# Patient Record
Sex: FEMALE | Race: WHITE | Employment: OTHER | URBAN - METROPOLITAN AREA
[De-identification: names, ages, dates, MRNs, and addresses within clinical notes are randomized per-mention and may not be internally consistent; named-entity substitution may affect disease eponyms.]

---

## 2018-03-22 PROBLEM — M85.89 OSTEOPENIA OF MULTIPLE SITES: Status: ACTIVE | Noted: 2018-03-22

## 2018-09-14 PROCEDURE — 83516 IMMUNOASSAY NONANTIBODY: CPT | Performed by: OPHTHALMOLOGY

## 2018-09-14 PROCEDURE — 83519 RIA NONANTIBODY: CPT | Performed by: OPHTHALMOLOGY

## 2018-09-14 PROCEDURE — 84238 ASSAY NONENDOCRINE RECEPTOR: CPT | Performed by: OPHTHALMOLOGY

## 2018-09-14 PROCEDURE — 86255 FLUORESCENT ANTIBODY SCREEN: CPT | Performed by: OPHTHALMOLOGY

## 2018-10-31 PROCEDURE — 83921 ORGANIC ACID SINGLE QUANT: CPT | Performed by: OTHER

## 2018-10-31 PROCEDURE — 86334 IMMUNOFIX E-PHORESIS SERUM: CPT | Performed by: OTHER

## 2018-10-31 PROCEDURE — 84425 ASSAY OF VITAMIN B-1: CPT | Performed by: OTHER

## 2018-10-31 PROCEDURE — 83883 ASSAY NEPHELOMETRY NOT SPEC: CPT | Performed by: OTHER

## 2018-10-31 PROCEDURE — 84165 PROTEIN E-PHORESIS SERUM: CPT | Performed by: OTHER

## 2021-08-04 PROBLEM — E46 PROTEIN-CALORIE MALNUTRITION, UNSPECIFIED SEVERITY (HCC): Status: ACTIVE | Noted: 2021-08-04

## 2024-08-21 ENCOUNTER — OFFICE VISIT (OUTPATIENT)
Dept: WOUND CARE | Facility: HOSPITAL | Age: 69
End: 2024-08-21
Attending: INTERNAL MEDICINE
Payer: MEDICARE

## 2024-08-21 VITALS
RESPIRATION RATE: 14 BRPM | SYSTOLIC BLOOD PRESSURE: 117 MMHG | DIASTOLIC BLOOD PRESSURE: 68 MMHG | BODY MASS INDEX: 19.1 KG/M2 | HEART RATE: 71 BPM | HEIGHT: 58 IN | TEMPERATURE: 97 F | WEIGHT: 91 LBS

## 2024-08-21 DIAGNOSIS — E46 PROTEIN-CALORIE MALNUTRITION, UNSPECIFIED SEVERITY (HCC): ICD-10-CM
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
## 2024-08-21 DIAGNOSIS — E55.9 VITAMIN D DEFICIENCY: ICD-10-CM

## 2024-08-21 DIAGNOSIS — L97.222 NON-PRESSURE CHRONIC ULCER OF LEFT CALF WITH FAT LAYER EXPOSED (HCC): Primary | ICD-10-CM

## 2024-08-21 DIAGNOSIS — I87.332 IDIOPATHIC CHRONIC VENOUS HYPERTENSION OF LEFT LOWER EXTREMITY WITH ULCER AND INFLAMMATION (HCC): ICD-10-CM

## 2024-08-21 PROCEDURE — 99214 OFFICE O/P EST MOD 30 MIN: CPT

## 2024-08-21 PROCEDURE — 97597 DBRDMT OPN WND 1ST 20 CM/<: CPT | Performed by: INTERNAL MEDICINE

## 2024-08-21 NOTE — PROGRESS NOTES
Patient ID: Lenora Mcdonald is a 68 year old female.    Debridement Traumatic Left;Medial Leg   Wound 08/21/24 #1 Left medial lower leg Leg Left;Medial    Performed by: Chapin Means MD  Authorized by: Chapin Means MD      Consent   Consent obtained? verbal  Consent given by: patient  Risks discussed? procedural risks discussed    Debridement Details  Performed by: physician  Debridement type: conservative sharp  Pain control: lidocaine 4%  Pain control administration type: topical    Pre-debridement measurements  Length (cm): 2  Width (cm): 1  Depth (cm): 0  Surface Area (cm^2): 2    Post-debridement measurements  Length (cm): 2  Width (cm): 1  Depth (cm): 0.1  Percent debrided: 50%  Surface Area (cm^2): 2  Area Debrided (cm^2): 1  Volume (cm^3): 0.2    Devitalized tissue debrided: biofilm and slough  Instrument(s) utilized: curette  Bleeding: small  Hemostasis obtained with: pressure  Procedural pain (0-10): 0  Post-procedural pain: 0   Response to treatment: procedure was tolerated well
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
## 2024-08-21 NOTE — PATIENT INSTRUCTIONS
Wound Cleaning and Dressings:    Wash your hands with soap and water. Always wear gloves while changing dressings. Donot touch wound / gisele-wound skin with un-gloved hands. Remove old dressing, discard and place into trash.      DRESSINGS: honey / gauze  Change dressing daily.    Compression Therapy : spandagrip  Compression Therapy Instructions:  1.  Put on first thing in the morning and may remove at bedside.  Okay to wear overnight      if comfortable.  Do not let stockings roll up/down and kink.  Hand wash stockings and      hang dry as needed.    2.  Avoid prolonged standing in one place.  It is better to have your calf muscles moving       to pump fluid out of the legs.    3.  Elevate leg(s) above the level of the heart when sitting or as much as possible.    4.  Take your diuretics as directed by your provider.  Do not skip doses or change doses      unless instructed to do so by your provider.    5. Do not get leg(s) with compression wrap wet. If wraps are too tight as indicated        By pain, numbness/tingling or discoloration of toes remove wrap completely       and call the   wound center.     Miscellaneous Instructions:  Supplement with a daily multivitamin   Low salt diet  Increase protein intake / consider protein supplements - see below  Elevate extremities at all times when sitting / laying down.    DIETARY MODIFICATIONS TO HELP WITH WOUND HEALING:    Protein: Meats, beans, eggs, milk and yogurt particularly Greek yogurt), tofu, soy nuts, soy protein products    Vitamin C: Citrus fruits and juices, strawberries, tomatoes, tomato juice, peppers, baked potatoes, spinach, broccoli, cauliflower, [place] sprouts, cabbage    Vitamin A: Dark green, leafy vegetables, orange or yellow vegetables, cantaloupe, fortified dairy products, liver, fortified cereals    Zinc: Fortified cereals, red meats, seafood    Consider Akhil [employer] (These are essential branch chain amino acids that help with tissue  building and wound healing) and take 2 packets/day. you can order online at [employer] or [employer]    ADDITIONAL REMINDERS:    The treatment plan has been discussed at length with you and your provider. Follow all instructions carefully, it is very important. If you do not follow all instructions, you are at  risk of your wound not healing, infection, possible loss of limb and even end of life.  Please call the clinic during regular business hours ( 7:30 AM - 5:30 PM) if you notice increased bleeding, redness, warmth, pain or pus like drainage or start running a fever greater than 100.3.    For after hour emergencies, please call your primary physician or go to the nearest emergency room.

## 2024-08-28 ENCOUNTER — OFFICE VISIT (OUTPATIENT)
Dept: WOUND CARE | Facility: HOSPITAL | Age: 69
End: 2024-08-28
Attending: FAMILY MEDICINE
Payer: MEDICARE

## 2024-08-28 VITALS
HEART RATE: 72 BPM | RESPIRATION RATE: 14 BRPM | TEMPERATURE: 97 F | DIASTOLIC BLOOD PRESSURE: 50 MMHG | SYSTOLIC BLOOD PRESSURE: 110 MMHG

## 2024-08-28 DIAGNOSIS — I87.332 IDIOPATHIC CHRONIC VENOUS HYPERTENSION OF LEFT LOWER EXTREMITY WITH ULCER AND INFLAMMATION (HCC): ICD-10-CM

## 2024-08-28 DIAGNOSIS — L97.222 NON-PRESSURE CHRONIC ULCER OF LEFT CALF WITH FAT LAYER EXPOSED (HCC): Primary | ICD-10-CM

## 2024-08-28 DIAGNOSIS — E46 PROTEIN-CALORIE MALNUTRITION, UNSPECIFIED SEVERITY (HCC): ICD-10-CM

## 2024-08-28 DIAGNOSIS — E55.9 VITAMIN D DEFICIENCY: ICD-10-CM
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
## 2024-08-28 PROCEDURE — 11042 DBRDMT SUBQ TIS 1ST 20SQCM/<: CPT | Performed by: INTERNAL MEDICINE
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
## 2024-08-28 NOTE — PROGRESS NOTES
Patient ID: Lenora Mcdonald is a 68 year old female.    Debridement Traumatic Left;Medial Leg   Wound 08/21/24 #1 Left medial lower leg Leg Left;Medial    Performed by: Chapin Means MD  Authorized by: Chapin Means MD      Consent   Consent obtained? verbal  Consent given by: patient  Risks discussed? procedural risks discussed    Debridement Details  Performed by: physician  Debridement type: surgical  Level of debridement: subcutaneous tissue  Pain control: lidocaine 4%  Pain control administration type: topical    Pre-debridement measurements  Length (cm): 1.5  Width (cm): 0.7  Depth (cm): 0.3  Surface Area (cm^2): 1.05    Post-debridement measurements  Length (cm): 1.5  Width (cm): 0.7  Depth (cm): 0.4  Percent debrided: 100%  Surface Area (cm^2): 1.05  Area Debrided (cm^2): 1.05  Volume (cm^3): 0.42    Tissue and other material debrided: subcutaneous tissue  Devitalized tissue debrided: biofilm and slough  Instrument(s) utilized: curette  Bleeding: medium  Hemostasis obtained with: pressure  Procedural pain (0-10): 4  Post-procedural pain: 0   Response to treatment: procedure was tolerated well

## 2024-08-28 NOTE — PROGRESS NOTES
Weekly Wound Education Note    Teaching Provided To: Patient  Training Topics: Cleasing and general instructions;Compression;Discharge instructions;Dressing;Edema control  Training Method: Explain/Verbal  Training Response: Patient responds and understands;Reinforcement needed        Notes: Stable.Continue honey gel and bordered gauze - change daily. Increase compression to 2 spandagrip D layers, ok to remove 1 layer at bedtime.

## 2024-08-28 NOTE — PATIENT INSTRUCTIONS
Return one week    Wound Cleaning and Dressings:    Wash your hands with soap and water. Always wear gloves while changing dressings. Donot touch wound / gisele-wound skin with un-gloved hands. Remove old dressing, discard and place into trash.      DRESSINGS: honey / gauze  Change dressing daily.    Compression Therapy : Double spandagrip  Compression Therapy Instructions:  1.  Put on first thing in the morning and may remove at bedside.  Okay to wear overnight      if comfortable.  Do not let stockings roll up/down and kink.  Hand wash stockings and      hang dry as needed.    2.  Avoid prolonged standing in one place.  It is better to have your calf muscles moving       to pump fluid out of the legs.    3.  Elevate leg(s) above the level of the heart when sitting or as much as possible.    4.  Take your diuretics as directed by your provider.  Do not skip doses or change doses      unless instructed to do so by your provider.    5. Do not get leg(s) with compression wrap wet. If wraps are too tight as indicated        By pain, numbness/tingling or discoloration of toes remove wrap completely       and call the   wound center.     Miscellaneous Instructions:  Supplement with a daily multivitamin   Low salt diet  Increase protein intake / consider protein supplements - see below  Elevate extremities at all times when sitting / laying down.    DIETARY MODIFICATIONS TO HELP WITH WOUND HEALING:    Protein: Meats, beans, eggs, milk and yogurt particularly Greek yogurt), tofu, soy nuts, soy protein products    Vitamin C: Citrus fruits and juices, strawberries, tomatoes, tomato juice, peppers, baked potatoes, spinach, broccoli, cauliflower, [place] sprouts, cabbage    Vitamin A: Dark green, leafy vegetables, orange or yellow vegetables, cantaloupe, fortified dairy products, liver, fortified cereals    Zinc: Fortified cereals, red meats, seafood    Consider Akhil by [employer] (These are essential branch chain amino  acids that help with tissue building and wound healing) and take 2 packets/day. you can order online at [employer] or [employer]    ADDITIONAL REMINDERS:    The treatment plan has been discussed at length with you and your provider. Follow all instructions carefully, it is very important. If you do not follow all instructions, you are at  risk of your wound not healing, infection, possible loss of limb and even end of life.  Please call the clinic during regular business hours ( 7:30 AM - 5:30 PM) if you notice increased bleeding, redness, warmth, pain or pus like drainage or start running a fever greater than 100.3.    For after hour emergencies, please call your primary physician or go to the nearest emergency room.

## 2024-09-04 ENCOUNTER — OFFICE VISIT (OUTPATIENT)
Dept: WOUND CARE | Facility: HOSPITAL | Age: 69
End: 2024-09-04
Attending: FAMILY MEDICINE
Payer: MEDICARE

## 2024-09-04 VITALS
HEART RATE: 71 BPM | RESPIRATION RATE: 16 BRPM | DIASTOLIC BLOOD PRESSURE: 67 MMHG | TEMPERATURE: 97 F | SYSTOLIC BLOOD PRESSURE: 116 MMHG

## 2024-09-04 DIAGNOSIS — I87.332 IDIOPATHIC CHRONIC VENOUS HYPERTENSION OF LEFT LOWER EXTREMITY WITH ULCER AND INFLAMMATION (HCC): ICD-10-CM

## 2024-09-04 DIAGNOSIS — T14.8XXD DELAYED WOUND HEALING: ICD-10-CM

## 2024-09-04 DIAGNOSIS — R23.8 SLOUGHING OF WOUND: ICD-10-CM

## 2024-09-04 DIAGNOSIS — E46 PROTEIN-CALORIE MALNUTRITION, UNSPECIFIED SEVERITY (HCC): ICD-10-CM

## 2024-09-04 DIAGNOSIS — L97.222 NON-PRESSURE CHRONIC ULCER OF LEFT CALF WITH FAT LAYER EXPOSED (HCC): Primary | ICD-10-CM

## 2024-09-04 DIAGNOSIS — E55.9 VITAMIN D DEFICIENCY: ICD-10-CM
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
## 2024-09-04 PROCEDURE — 97597 DBRDMT OPN WND 1ST 20 CM/<: CPT | Performed by: INTERNAL MEDICINE

## 2024-09-04 NOTE — PATIENT INSTRUCTIONS
Return Monday for nurse visit and weekly nurse visits.   Switch to provider visit in case of concerns.   See me 9/25/24    Wound Cleaning and Dressings:    Wash your hands with soap and water. Always wear gloves while changing dressings. Donot touch wound / gisele-wound skin with un-gloved hands. Remove old dressing, discard and place into trash.      DRESSINGS: endoform / HF transfer  Coloplast triad paste periwound.   Change dressing twice a week.     Compression Therapy : UNNA boot.   Compression Therapy Instructions:  1.  Put on first thing in the morning and may remove at bedside.  Okay to wear overnight      if comfortable.  Do not let stockings roll up/down and kink.  Hand wash stockings and      hang dry as needed.    2.  Avoid prolonged standing in one place.  It is better to have your calf muscles moving       to pump fluid out of the legs.    3.  Elevate leg(s) above the level of the heart when sitting or as much as possible.    4.  Take your diuretics as directed by your provider.  Do not skip doses or change doses      unless instructed to do so by your provider.    5. Do not get leg(s) with compression wrap wet. If wraps are too tight as indicated        By pain, numbness/tingling or discoloration of toes remove wrap completely       and call the   wound center.     Miscellaneous Instructions:  Supplement with a daily multivitamin   Low salt diet  Increase protein intake / consider protein supplements - see below  Elevate extremities at all times when sitting / laying down.    DIETARY MODIFICATIONS TO HELP WITH WOUND HEALING:    Protein: Meats, beans, eggs, milk and yogurt particularly Greek yogurt), tofu, soy nuts, soy protein products    Vitamin C: Citrus fruits and juices, strawberries, tomatoes, tomato juice, peppers, baked potatoes, spinach, broccoli, cauliflower, [place] sprouts, cabbage    Vitamin A: Dark green, leafy vegetables, orange or yellow vegetables, cantaloupe, fortified dairy  products, liver, fortified cereals    Zinc: Fortified cereals, red meats, seafood    Consider Akhil by [employer] (These are essential branch chain amino acids that help with tissue building and wound healing) and take 2 packets/day. you can order online at [employer] or [employer]    ADDITIONAL REMINDERS:    The treatment plan has been discussed at length with you and your provider. Follow all instructions carefully, it is very important. If you do not follow all instructions, you are at  risk of your wound not healing, infection, possible loss of limb and even end of life.  Please call the clinic during regular business hours ( 7:30 AM - 5:30 PM) if you notice increased bleeding, redness, warmth, pain or pus like drainage or start running a fever greater than 100.3.    For after hour emergencies, please call your primary physician or go to the nearest emergency room.

## 2024-09-04 NOTE — PROGRESS NOTES
Patient ID: Lenora Mcdonald is a 68 year old female.    Debridement Traumatic Left;Medial Leg   Wound 08/21/24 #1 Left medial lower leg Leg Left;Medial    Performed by: Chapin Means MD  Authorized by: Chapin Means MD      Consent   Consent obtained? verbal  Consent given by: patient  Risks discussed? procedural risks discussed    Debridement Details  Performed by: physician  Debridement type: conservative sharp  Pain control: lidocaine 4%  Pain control administration type: topical    Pre-debridement measurements  Length (cm): 1.6  Width (cm): 0.5  Depth (cm): 0.2  Surface Area (cm^2): 0.8    Post-debridement measurements  Length (cm): 1.6  Width (cm): 0.5  Depth (cm): 0.2  Percent debrided: 100%  Surface Area (cm^2): 0.8  Area Debrided (cm^2): 0.8  Volume (cm^3): 0.16    Devitalized tissue debrided: biofilm and slough  Instrument(s) utilized: curette  Bleeding: small  Hemostasis obtained with: pressure  Procedural pain (0-10): 2  Post-procedural pain: 0   Response to treatment: procedure was tolerated well

## 2024-09-04 NOTE — PROGRESS NOTES
Weekly Wound Education Note    Teaching Provided To: Patient  Training Topics: Cleasing and general instructions;Compression;Discharge instructions;Dressing;Edema control  Training Method: Explain/Verbal  Training Response: Patient responds and understands;Reinforcement needed        Notes: Stable. honey gel, honey alginate, kerramax, unna boot 20-30mmhg. Compression wrap information sheet provided, educated on compression.
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
## 2024-09-09 ENCOUNTER — OFFICE VISIT (OUTPATIENT)
Dept: WOUND CARE | Facility: HOSPITAL | Age: 69
End: 2024-09-09
Attending: INTERNAL MEDICINE
Payer: MEDICARE

## 2024-09-09 VITALS
DIASTOLIC BLOOD PRESSURE: 59 MMHG | TEMPERATURE: 98 F | HEART RATE: 81 BPM | RESPIRATION RATE: 14 BRPM | SYSTOLIC BLOOD PRESSURE: 113 MMHG

## 2024-09-09 DIAGNOSIS — L97.222 NON-PRESSURE CHRONIC ULCER OF LEFT CALF WITH FAT LAYER EXPOSED (HCC): Primary | ICD-10-CM

## 2024-09-09 DIAGNOSIS — I87.332 IDIOPATHIC CHRONIC VENOUS HYPERTENSION OF LEFT LOWER EXTREMITY WITH ULCER AND INFLAMMATION (HCC): ICD-10-CM

## 2024-09-09 PROCEDURE — 29581 APPL MULTLAYER CMPRN SYS LEG: CPT

## 2024-09-09 NOTE — PROGRESS NOTES
Chief Complaint   Patient presents with    Wound Care     Patient is here for a nurse visit for a left leg wound.           Current Outpatient Medications:     Esomeprazole Magnesium 40 MG Oral Capsule Delayed Release, Take 1 capsule (40 mg total) by mouth every morning before breakfast., Disp: 90 capsule, Rfl: 3    SUMAtriptan Succinate (IMITREX) 50 MG Oral Tab, TAKE 2 TABLETS IN 24 HOURS AND NOT MORE THEN 5 IN 1 WEEK, Disp: 27 tablet, Rfl: 0    No Known Allergies       HISTORY:     Past medical, surgical, family and social history updated where appropriate.    PHYSICAL EXAM:   /59   Pulse 81   Temp 97.8 °F (36.6 °C)   Resp 14   LMP  (LMP Unknown)        Vital signs reviewed.      Calf  Point of Measurement - Left Calf: 30        Calf Left cm:: 27.8          Ankle  Point of Measurement - Left Ankle: 10        Left Ankle cm:: 16.5                Wound 08/21/24 #1 Left medial lower leg Leg Left;Medial (Active)   Date First Assessed/Time First Assessed: 08/21/24 0738    Wound Number (Wound Clinic Only): #1 Left medial lower leg  Primary Wound Type: Traumatic  Location: Leg  Wound Location Orientation: Left;Medial      Assessments 8/21/2024  7:39 AM 9/9/2024  8:57 AM   Wound Image        Drainage Amount Unable to assess Scant   Drainage Description -- Serous;Yellow   Treatments Compression Compression   Wound Length (cm) 2 cm 1.5 cm   Wound Width (cm) 1 cm 0.6 cm   Wound Surface Area (cm^2) 2 cm^2 0.9 cm^2   Wound Depth (cm) 0 cm 0.2 cm   Wound Volume (cm^3) 0 cm^3 0.18 cm^3   Margins Well-defined edges Well-defined edges   Non-staged Wound Description Full thickness Full thickness   Zarina-wound Assessment Induration;Pink Dry;Clean;Hemosiderin staining   Wound Granulation Tissue Firm;Red Pink;Firm   Wound Bed Granulation (%) 10 % 95 %   Wound Bed Slough (%) 90 % 5 %   Wound Odor None None   Tunneling? No No   Undermining? No No   Sinus Tracts? No No       Inactive Orders   Date Order Priority Status  Authorizing Provider   09/04/24 1556 Debridement Traumatic Left;Medial Leg Routine Completed Chapin Means MD   08/28/24 0848 Debridement Traumatic Left;Medial Leg Routine Completed Chapin Means MD   08/21/24 0809 Debridement Traumatic Left;Medial Leg Routine Completed Chapin Means MD       Compression Wrap 09/09/24 Leg Anterior;Left (Active)   Placement Date/Time: 09/09/24 0900   Location: Leg  Wound Location Orientation: Anterior;Left      Assessments 9/9/2024 10:46 AM   Response to Treatment Well tolerated   Compression Layers Multilayer   Compression Product Type Unna Boot   Dressing Applied Yes   Compression Wrap Location Toes to Knee   Compression Wrap Status Clean;Dry;Intact       No associated orders.          ASSESSMENT AND PLAN:        Risks, benefits, and alternatives of current treatment plan discussed in detail.  Questions and concerns addressed. Red flags to RTC or ED reviewed.  Patient (or parent) agrees to plan.      No follow-ups on file.  Weekly Wound Education Note    Teaching Provided To: Patient  Training Topics: Cleasing and general instructions;Compression;Discharge instructions;Edema control;Dressing  Training Method: Explain/Verbal  Training Response: Patient responds and understands         Patient arrived to clinic for RN visit. Edema measurements are slightly decreased. Wound measurements are stable and wound bed appears healthier. Patient had complaints of a burning pain medial to the medial ankle bone. 1/2 ABD pad used to pad that area. Changed dressing to Endoform, Hydrofera Transfer, 1/2 ABD pad over wound, and unna-boot 20-30mmHg per provider's orders. Instructed patient to follow up within a week and call if any issues with the unna-boot arise.               Hector ROJO RN   9/9/2024  10:49 AM

## 2024-09-11 ENCOUNTER — APPOINTMENT (OUTPATIENT)
Dept: WOUND CARE | Facility: HOSPITAL | Age: 69
End: 2024-09-11
Attending: INTERNAL MEDICINE
Payer: MEDICARE

## 2024-09-16 ENCOUNTER — OFFICE VISIT (OUTPATIENT)
Dept: WOUND CARE | Facility: HOSPITAL | Age: 69
End: 2024-09-16
Attending: INTERNAL MEDICINE
Payer: MEDICARE

## 2024-09-16 VITALS
DIASTOLIC BLOOD PRESSURE: 60 MMHG | TEMPERATURE: 97 F | SYSTOLIC BLOOD PRESSURE: 117 MMHG | RESPIRATION RATE: 14 BRPM | HEART RATE: 68 BPM

## 2024-09-16 DIAGNOSIS — L97.222 NON-PRESSURE CHRONIC ULCER OF LEFT CALF WITH FAT LAYER EXPOSED (HCC): Primary | ICD-10-CM

## 2024-09-16 DIAGNOSIS — I87.332 IDIOPATHIC CHRONIC VENOUS HYPERTENSION OF LEFT LOWER EXTREMITY WITH ULCER AND INFLAMMATION (HCC): ICD-10-CM

## 2024-09-16 PROCEDURE — 29581 APPL MULTLAYER CMPRN SYS LEG: CPT

## 2024-09-16 NOTE — PROGRESS NOTES
Chief Complaint   Patient presents with    Wound Care     Arrives for nurse visit. Unna boot intact. Denies new concerns.           Current Outpatient Medications:     Esomeprazole Magnesium 40 MG Oral Capsule Delayed Release, Take 1 capsule (40 mg total) by mouth every morning before breakfast., Disp: 90 capsule, Rfl: 3    SUMAtriptan Succinate (IMITREX) 50 MG Oral Tab, TAKE 2 TABLETS IN 24 HOURS AND NOT MORE THEN 5 IN 1 WEEK, Disp: 27 tablet, Rfl: 0    No Known Allergies       HISTORY:     Past medical, surgical, family and social history updated where appropriate.    PHYSICAL EXAM:   /60   Pulse 68   Temp 97.3 °F (36.3 °C)   Resp 14   LMP  (LMP Unknown)        Vital signs reviewed.      Calf  Point of Measurement - Left Calf: 30     Left Calf from:: Heel  Calf Left cm:: 28.5          Ankle  Point of Measurement - Left Ankle: 10     Left Ankle from:: Heel  Left Ankle cm:: 17.8                Wound 08/21/24 #1 Left medial lower leg Leg Left;Medial (Active)   Date First Assessed/Time First Assessed: 08/21/24 0738    Wound Number (Wound Clinic Only): #1 Left medial lower leg  Primary Wound Type: Traumatic  Location: Leg  Wound Location Orientation: Left;Medial      Assessments 8/21/2024  7:39 AM 9/16/2024  8:30 AM   Wound Image         Drainage Amount Unable to assess Scant   Drainage Description -- Serous;Yellow   Treatments Compression Compression   Wound Length (cm) 2 cm 1 cm   Wound Width (cm) 1 cm 0.4 cm   Wound Surface Area (cm^2) 2 cm^2 0.4 cm^2   Wound Depth (cm) 0 cm 0.1 cm   Wound Volume (cm^3) 0 cm^3 0.04 cm^3   Margins Well-defined edges Well-defined edges   Non-staged Wound Description Full thickness Full thickness   Zarina-wound Assessment Induration;Pink Dry;Clean;Hemosiderin staining   Wound Granulation Tissue Firm;Red Pink;Firm   Wound Bed Granulation (%) 10 % 70 %   Wound Bed Slough (%) 90 % 30 %   Wound Odor None None   Tunneling? No No   Undermining? No No   Sinus Tracts? No No        Inactive Orders   Date Order Priority Status Authorizing Provider   09/04/24 1556 Debridement Traumatic Left;Medial Leg Routine Completed Chapin Means MD   08/28/24 0848 Debridement Traumatic Left;Medial Leg Routine Completed Chapin Means MD   08/21/24 0809 Debridement Traumatic Left;Medial Leg Routine Completed Chapin Means MD       Compression Wrap 09/09/24 Leg Anterior;Left (Active)   Placement Date/Time: 09/09/24 0900   Location: Leg  Wound Location Orientation: Anterior;Left      Assessments 9/9/2024 10:46 AM   Response to Treatment Well tolerated   Compression Layers Multilayer   Compression Product Type Unna Boot   Dressing Applied Yes   Compression Wrap Location Toes to Knee   Compression Wrap Status Clean;Dry;Intact       No associated orders.          ASSESSMENT AND PLAN:        Risks, benefits, and alternatives of current treatment plan discussed in detail.  Questions and concerns addressed. Red flags to RTC or ED reviewed.  Patient (or parent) agrees to plan.      No follow-ups on file.  Weekly Wound Education Note    Teaching Provided To: Patient  Training Topics: Cleasing and general instructions;Compression;Discharge instructions;Dressing;Edema control  Training Method: Explain/Verbal  Training Response: Patient responds and understands         Patient arrived for RN visit. Arrives with unna-boot 20-30mmHg in place. No issues or concerns with compression wrap. Patient did mention that the wound is more painful for a couple hours after the appointment, but the pain subsides after the couple hours. Patient reports taking Tylenol to decrease the initial pain. Encouraged patient to ice her leg to decrease pain as well as elevate the leg. Edema measurement to left leg is stable, slightly elevated. Wound is measuring smaller. Wound was dry so dressing changed to Tamra Ag, folded xeroform, 1/2 ABD pad, and unna-boot 20-30mmHg. Patient to return to clinic this Friday for RN  visit.               Hector ROJO RN   9/16/2024  9:10 AM

## 2024-09-18 ENCOUNTER — APPOINTMENT (OUTPATIENT)
Dept: WOUND CARE | Facility: HOSPITAL | Age: 69
End: 2024-09-18
Attending: INTERNAL MEDICINE
Payer: MEDICARE

## 2024-09-20 ENCOUNTER — OFFICE VISIT (OUTPATIENT)
Dept: WOUND CARE | Facility: HOSPITAL | Age: 69
End: 2024-09-20
Attending: INTERNAL MEDICINE
Payer: MEDICARE

## 2024-09-20 VITALS
SYSTOLIC BLOOD PRESSURE: 110 MMHG | HEART RATE: 71 BPM | DIASTOLIC BLOOD PRESSURE: 65 MMHG | TEMPERATURE: 98 F | RESPIRATION RATE: 14 BRPM

## 2024-09-20 DIAGNOSIS — L97.222 NON-PRESSURE CHRONIC ULCER OF LEFT CALF WITH FAT LAYER EXPOSED (HCC): Primary | ICD-10-CM

## 2024-09-20 PROCEDURE — 29581 APPL MULTLAYER CMPRN SYS LEG: CPT

## 2024-09-20 NOTE — PROGRESS NOTES
Chief Complaint   Patient presents with    Wound Care     Patient arrives for an RN visit. Patient states she has no pain. Patient arrives with an unna wrap to the left leg; wrap stayed up well. Patient has xeroform and an ABD pad to the wound.            Current Outpatient Medications:     Esomeprazole Magnesium 40 MG Oral Capsule Delayed Release, Take 1 capsule (40 mg total) by mouth every morning before breakfast., Disp: 90 capsule, Rfl: 3    SUMAtriptan Succinate (IMITREX) 50 MG Oral Tab, TAKE 2 TABLETS IN 24 HOURS AND NOT MORE THEN 5 IN 1 WEEK, Disp: 27 tablet, Rfl: 0    No Known Allergies       HISTORY:     Past medical, surgical, family and social history updated where appropriate.    PHYSICAL EXAM:   /65   Pulse 71   Temp 98 °F (36.7 °C)   Resp 14   LMP  (LMP Unknown)        Vital signs reviewed.      Calf  Point of Measurement - Left Calf: 30     Left Calf from:: Heel  Calf Left cm:: 28.5          Ankle  Point of Measurement - Left Ankle: 10     Left Ankle from:: Heel  Left Ankle cm:: 17.5                Wound 08/21/24 #1 Left medial lower leg Leg Left;Medial (Active)   Date First Assessed/Time First Assessed: 08/21/24 0738    Wound Number (Wound Clinic Only): #1 Left medial lower leg  Primary Wound Type: Traumatic  Location: Leg  Wound Location Orientation: Left;Medial      Assessments 8/21/2024  7:39 AM 9/20/2024  8:11 AM   Wound Image        Drainage Amount Unable to assess Small   Drainage Description -- Serosanguineous   Treatments Compression Compression   Wound Length (cm) 2 cm 0.9 cm   Wound Width (cm) 1 cm 0.3 cm   Wound Surface Area (cm^2) 2 cm^2 0.27 cm^2   Wound Depth (cm) 0 cm 0.1 cm   Wound Volume (cm^3) 0 cm^3 0.027 cm^3   Margins Well-defined edges Well-defined edges   Non-staged Wound Description Full thickness Full thickness   Zarina-wound Assessment Induration;Pink Moist;Clean   Wound Granulation Tissue Firm;Red Pink;Pale Grey;Firm   Wound Bed Granulation (%) 10 % 100 %   Wound  Bed Slough (%) 90 % --   Wound Odor None None   Tunneling? No No   Undermining? No No   Sinus Tracts? No No       Inactive Orders   Date Order Priority Status Authorizing Provider   09/04/24 1556 Debridement Traumatic Left;Medial Leg Routine Completed Chapin Means MD   08/28/24 0848 Debridement Traumatic Left;Medial Leg Routine Completed Chapin Means MD   08/21/24 0809 Debridement Traumatic Left;Medial Leg Routine Completed Chapin Means MD       Compression Wrap 09/09/24 Leg Anterior;Left (Active)   Placement Date/Time: 09/09/24 0900   Location: Leg  Wound Location Orientation: Anterior;Left      Assessments 9/9/2024 10:46 AM 9/20/2024  8:11 AM   Response to Treatment Well tolerated Well tolerated   Compression Layers Multilayer Multilayer   Compression Product Type Unna Boot Unna Boot   Dressing Applied Yes Yes   Compression Wrap Location Toes to Knee Toes to Knee   Compression Wrap Status Clean;Dry;Intact Clean;Dry;Intact       No associated orders.          ASSESSMENT AND PLAN:        Risks, benefits, and alternatives of current treatment plan discussed in detail.  Questions and concerns addressed. Red flags to RTC or ED reviewed.  Patient (or parent) agrees to plan.      No follow-ups on file.  Weekly Wound Education Note    Teaching Provided To: Patient  Training Topics: Cleasing and general instructions;Diabetes;Discharge instructions;Dressing  Training Method: Explain/Verbal  Training Response: Patient responds and understands         Arrives for RN visit. Edema measurements stable. Wound measuring slightly smaller. Patient brought back own dressing supplies from last time. Continue Tamra Ag, folded xeroform, 1/2 ABD pad, and calamine unna-boot 20-30mmHg. 1/2 ABD pad placed over lateral & medial side of ankle for added cushion/protection.               Hector ROJO RN   9/20/2024  8:28 AM

## 2024-09-25 ENCOUNTER — OFFICE VISIT (OUTPATIENT)
Dept: WOUND CARE | Facility: HOSPITAL | Age: 69
End: 2024-09-25
Attending: INTERNAL MEDICINE
Payer: MEDICARE

## 2024-09-25 VITALS
SYSTOLIC BLOOD PRESSURE: 111 MMHG | HEART RATE: 108 BPM | DIASTOLIC BLOOD PRESSURE: 48 MMHG | TEMPERATURE: 98 F | RESPIRATION RATE: 14 BRPM

## 2024-09-25 DIAGNOSIS — I87.332 IDIOPATHIC CHRONIC VENOUS HYPERTENSION OF LEFT LOWER EXTREMITY WITH ULCER AND INFLAMMATION (HCC): ICD-10-CM

## 2024-09-25 DIAGNOSIS — E55.9 VITAMIN D DEFICIENCY: ICD-10-CM

## 2024-09-25 DIAGNOSIS — R23.8 SLOUGHING OF WOUND: ICD-10-CM

## 2024-09-25 DIAGNOSIS — T14.8XXD DELAYED WOUND HEALING: ICD-10-CM

## 2024-09-25 DIAGNOSIS — L97.222 NON-PRESSURE CHRONIC ULCER OF LEFT CALF WITH FAT LAYER EXPOSED (HCC): Primary | ICD-10-CM

## 2024-09-25 PROCEDURE — 29581 APPL MULTLAYER CMPRN SYS LEG: CPT

## 2024-09-25 NOTE — PATIENT INSTRUCTIONS
See me one week    Wound Cleaning and Dressings:    Wash your hands with soap and water. Always wear gloves while changing dressings. Donot touch wound / gisele-wound skin with un-gloved hands. Remove old dressing, discard and place into trash.      DRESSINGS:xeroform / unna boot  Change dressing weekly    Compression Therapy : UNNA boot.   Compression Therapy Instructions:  1.  Put on first thing in the morning and may remove at bedside.  Okay to wear overnight      if comfortable.  Do not let stockings roll up/down and kink.  Hand wash stockings and      hang dry as needed.    2.  Avoid prolonged standing in one place.  It is better to have your calf muscles moving       to pump fluid out of the legs.    3.  Elevate leg(s) above the level of the heart when sitting or as much as possible.    4.  Take your diuretics as directed by your provider.  Do not skip doses or change doses      unless instructed to do so by your provider.    5. Do not get leg(s) with compression wrap wet. If wraps are too tight as indicated        By pain, numbness/tingling or discoloration of toes remove wrap completely       and call the   wound center.     Miscellaneous Instructions:  Supplement with a daily multivitamin   Low salt diet  Increase protein intake / consider protein supplements - see below  Elevate extremities at all times when sitting / laying down.    DIETARY MODIFICATIONS TO HELP WITH WOUND HEALING:    Protein: Meats, beans, eggs, milk and yogurt particularly Greek yogurt), tofu, soy nuts, soy protein products    Vitamin C: Citrus fruits and juices, strawberries, tomatoes, tomato juice, peppers, baked potatoes, spinach, broccoli, cauliflower, [place] sprouts, cabbage    Vitamin A: Dark green, leafy vegetables, orange or yellow vegetables, cantaloupe, fortified dairy products, liver, fortified cereals    Zinc: Fortified cereals, red meats, seafood    Consider Akhil by [employer] (These are essential branch chain amino  acids that help with tissue building and wound healing) and take 2 packets/day. you can order online at [employer] or [employer]    ADDITIONAL REMINDERS:    The treatment plan has been discussed at length with you and your provider. Follow all instructions carefully, it is very important. If you do not follow all instructions, you are at  risk of your wound not healing, infection, possible loss of limb and even end of life.  Please call the clinic during regular business hours ( 7:30 AM - 5:30 PM) if you notice increased bleeding, redness, warmth, pain or pus like drainage or start running a fever greater than 100.3.    For after hour emergencies, please call your primary physician or go to the nearest emergency room.

## 2024-09-25 NOTE — PROGRESS NOTES
Weekly Wound Education Note    Teaching Provided To: Patient  Training Topics: Cleasing and general instructions;Compression;Discharge instructions;Dressing;Edema control  Training Method: Explain/Verbal  Training Response: Patient responds and understands;Reinforcement needed        Notes: Improving. Xeroform, unna boot 20-30mmhg, 1/2 ABD pad to lateral/medial ankles for protection.

## 2024-09-25 NOTE — PROGRESS NOTES
[facility] PROGRESS NOTE  ABRIL PALOMINO MD  9/25/2024    Chief Complaint:   Chief Complaint   Patient presents with    Wound Care     Follow up for left leg wound. No complaints at this time.        HPI:   Subjective   Lenora Mcdonald is a 68 year old female coming in for a follow-up visit.    HPI    Wound dimensions and tissue quality improved   Tolerating wrap OK.     Review of Systems  Negative except HPI   Denies chest pain / SOB / palpitations  Denies fever.     Allergies  No Known Allergies    Current Meds:  Current Outpatient Medications   Medication Sig Dispense Refill    Esomeprazole Magnesium 40 MG Oral Capsule Delayed Release Take 1 capsule (40 mg total) by mouth every morning before breakfast. 90 capsule 3    SUMAtriptan Succinate (IMITREX) 50 MG Oral Tab TAKE 2 TABLETS IN 24 HOURS AND NOT MORE THEN 5 IN 1 WEEK 27 tablet 0         EXAM:   Objective   Objective    Physical Exam    Vital Signs  Vitals:    09/25/24 1500   BP: 111/48   Pulse: 108   Resp: 14   Temp: 97.7 °F (36.5 °C)       Wound Assessment  Wound 08/21/24 #1 Left medial lower leg Leg Left;Medial (Active)   Date First Assessed/Time First Assessed: 08/21/24 0738    Wound Number (Wound Clinic Only): #1 Left medial lower leg  Primary Wound Type: Traumatic  Location: Leg  Wound Location Orientation: Left;Medial      Assessments 9/25/2024  4:06 PM   Wound Image     Drainage Amount None   Wound Length (cm) 0.1 cm   Wound Width (cm) 0.1 cm   Wound Surface Area (cm^2) 0.01 cm^2   Wound Depth (cm) 0 cm   Wound Volume (cm^3) 0 cm^3   Margins Well-defined edges   Non-staged Wound Description Full thickness   Zarina-wound Assessment Clean   Wound Granulation Tissue Pink;Firm   Wound Bed Granulation (%) 100 %   Wound Odor None       Inactive Orders   Date Order Priority Status Authorizing Provider   09/04/24 1556 Debridement Traumatic Left;Medial Leg Routine Completed Abril Means MD   08/28/24 0848 Debridement Traumatic Left;Medial Leg  Routine Completed Chapin Means MD   08/21/24 0809 Debridement Traumatic Left;Medial Leg Routine Completed Chapin Means MD                ASSESSMENT AND PLAN:     Assessment     Encounter Diagnosis  1. 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 Idiopathic chronic venous hypertension of left lower extremity with ulcer and inflammation (HCC)    3. Sloughing of wound    4. Delayed wound healing    5. Vitamin D deficiency            PROCEDURES:    Compression wrap application.       PLAN OF CARE:    Continue current dressings and wrap  Watch out for signs of early infection - counseled.   Plan of care discussed with patient in detail - All questions answered   Return in one week.     Patient Instructions     See me one week    Wound Cleaning and Dressings:    Wash your hands with soap and water. Always wear gloves while changing dressings. Donot touch wound / gisele-wound skin with un-gloved hands. Remove old dressing, discard and place into trash.      DRESSINGS:xeroform / unna boot  Change dressing weekly    Compression Therapy : UNNA boot.   Compression Therapy Instructions:  1.  Put on first thing in the morning and may remove at bedside.  Okay to wear overnight      if comfortable.  Do not let stockings roll up/down and kink.  Hand wash stockings and      hang dry as needed.    2.  Avoid prolonged standing in one place.  It is better to have your calf muscles moving       to pump fluid out of the legs.    3.  Elevate leg(s) above the level of the heart when sitting or as much as possible.    4.  Take your diuretics as directed by your provider.  Do not skip doses or change doses      unless instructed to do so by your provider.    5. Do not get leg(s) with compression wrap wet. If wraps are too tight as indicated        By pain, numbness/tingling or discoloration of toes remove wrap completely       and call the   wound center.     Miscellaneous Instructions:  Supplement  with a daily multivitamin   Low salt diet  Increase protein intake / consider protein supplements - see below  Elevate extremities at all times when sitting / laying down.    DIETARY MODIFICATIONS TO HELP WITH WOUND HEALING:    Protein: Meats, beans, eggs, milk and yogurt particularly Greek yogurt), tofu, soy nuts, soy protein products    Vitamin C: Citrus fruits and juices, strawberries, tomatoes, tomato juice, peppers, baked potatoes, spinach, broccoli, cauliflower, [place] sprouts, cabbage    Vitamin A: Dark green, leafy vegetables, orange or yellow vegetables, cantaloupe, fortified dairy products, liver, fortified cereals    Zinc: Fortified cereals, red meats, seafood    Consider Akhil by [employer] (These are essential branch chain amino acids that help with tissue building and wound healing) and take 2 packets/day. you can order online at [employer] or [employer]    ADDITIONAL REMINDERS:    The treatment plan has been discussed at length with you and your provider. Follow all instructions carefully, it is very important. If you do not follow all instructions, you are at  risk of your wound not healing, infection, possible loss of limb and even end of life.  Please call the clinic during regular business hours ( 7:30 AM - 5:30 PM) if you notice increased bleeding, redness, warmth, pain or pus like drainage or start running a fever greater than 100.3.    For after hour emergencies, please call your primary physician or go to the nearest emergency room.      Patient/Caregiver Education: There are no barriers to learning. Medical education for above diagnosis given.   Answered all questions.    Outcome: Patient verbalizes understanding. Patient is notified to call with any questions, complications, allergies, or worsening or changing symptoms.  Patient is to call with any side effects or complications as a result of the treatments today.      DOCUMENTATION OF TIME SPENT: Code selection for this visit was based on time  spent : 30 min on date of service in preparing to see the patient, obtaining and/or reviewing separately obtained history, performing a medically appropriate examination, counseling and educating the patient/family/caregiver, ordering medications or testing, referring and communicating with other healthcare providers, documenting clinical information in the E HR, independently interpreting results and communicating results to the patient/family/caregiver and care coordination with the patient's other providers.    Followup: Return in about 1 week (around 10/2/2024) for Wound followup.      Note to Patient:  The 21st Century Cures Act makes medical notes like these available to patients in the interest of transparency. However, be advised this is a medical document and is intended as [id number] communication; it is written in medical language and may appear blunt, direct, or contain abbreviations or verbiage that are unfamiliar. Medical documents are intended to carry relevant information, facts as evident, and the clinical opinion of the practitioner.    Also, please note that this report has been produced using speech recognition software and may contain errors related to that system including, but not limited to, errors in grammar, punctuation, and spelling, as well as words and phrases that possibly may have been recognized inappropriately.  If there are any questions or concerns, contact the dictating provider for clarification.      Chapin Blair MD  9/25/2024  4:13 PM

## 2024-10-02 ENCOUNTER — OFFICE VISIT (OUTPATIENT)
Dept: WOUND CARE | Facility: HOSPITAL | Age: 69
End: 2024-10-02
Attending: INTERNAL MEDICINE
Payer: MEDICARE

## 2024-10-02 VITALS
TEMPERATURE: 97 F | DIASTOLIC BLOOD PRESSURE: 62 MMHG | SYSTOLIC BLOOD PRESSURE: 124 MMHG | HEART RATE: 63 BPM | RESPIRATION RATE: 15 BRPM

## 2024-10-02 DIAGNOSIS — E46 PROTEIN-CALORIE MALNUTRITION, UNSPECIFIED SEVERITY (HCC): ICD-10-CM

## 2024-10-02 DIAGNOSIS — R23.8 SLOUGHING OF WOUND: ICD-10-CM

## 2024-10-02 DIAGNOSIS — I87.332 IDIOPATHIC CHRONIC VENOUS HYPERTENSION OF LEFT LOWER EXTREMITY WITH ULCER AND INFLAMMATION (HCC): ICD-10-CM

## 2024-10-02 DIAGNOSIS — L97.222 NON-PRESSURE CHRONIC ULCER OF LEFT CALF WITH FAT LAYER EXPOSED (HCC): Primary | ICD-10-CM

## 2024-10-02 DIAGNOSIS — T14.8XXD DELAYED WOUND HEALING: ICD-10-CM

## 2024-10-02 PROCEDURE — 99213 OFFICE O/P EST LOW 20 MIN: CPT

## 2024-10-02 NOTE — PATIENT INSTRUCTIONS
Keep previosuly healed area moisturized   Wear compression garment at all times when not in bed.

## 2024-10-02 NOTE — PROGRESS NOTES
Weekly Wound Education Note    Teaching Provided To: Patient  Training Topics: Cleasing and general instructions;Compression;Edema control;Discharge instructions;Skin care  Training Method: Explain/Verbal  Training Response: Patient responds and understands;Reinforcement needed        Notes: Per provider wound is healed. Applied tru WADE today.

## 2024-10-02 NOTE — PROGRESS NOTES
[facility] PROGRESS NOTE  ABRIL PALOMINO MD  10/2/2024    Chief Complaint:   Chief Complaint   Patient presents with    Wound Care     Pt here for follow up wound care visit to left medial lower leg. Pt denies any concerns or issues. Pt denies any pain in wound at this time.        HPI:   Subjective   Lenora Mcdonald is a 68 year old female coming in for a follow-up visit.    HPI    Wound closed  Skin seems mature.   Sh has several qns about future care of skin of her leg    Review of Systems  Negative except HPI   Denies chest pain / SOB / palpitations  Denies fever.     Allergies  No Known Allergies    Current Meds:  Current Outpatient Medications   Medication Sig Dispense Refill    Esomeprazole Magnesium 40 MG Oral Capsule Delayed Release Take 1 capsule (40 mg total) by mouth every morning before breakfast. 90 capsule 3    SUMAtriptan Succinate (IMITREX) 50 MG Oral Tab TAKE 2 TABLETS IN 24 HOURS AND NOT MORE THEN 5 IN 1 WEEK 27 tablet 0         EXAM:   Objective   Objective    Physical Exam    Vital Signs  Vitals:    10/02/24 1100   BP: 124/62   Pulse: 63   Resp: 15   Temp: 97.3 °F (36.3 °C)       Wound Assessment  Wound 08/21/24 #1 Left medial lower leg Leg Left;Medial (Active)   Date First Assessed/Time First Assessed: 08/21/24 0738    Wound Number (Wound Clinic Only): #1 Left medial lower leg  Primary Wound Type: Traumatic  Location: Leg  Wound Location Orientation: Left;Medial      Assessments 8/21/2024  7:39 AM 10/2/2024 11:21 AM   Wound Image        Drainage Amount Unable to assess Scant   Drainage Description -- Serosanguineous   Treatments Compression (spandagrip D) --   Wound Length (cm) 2 cm 0.1 cm   Wound Width (cm) 1 cm 0.1 cm   Wound Surface Area (cm^2) 2 cm^2 0.01 cm^2   Wound Depth (cm) 0 cm 0 cm   Wound Volume (cm^3) 0 cm^3 0 cm^3   Margins Well-defined edges Flat and Intact   Non-staged Wound Description Full thickness Full thickness   Zarina-wound Assessment Induration;Pink Clean;Moist    Wound Granulation Tissue Firm;Red Pink;Pale Grey;Firm   Wound Bed Granulation (%) 10 % 100 %   Wound Bed Slough (%) 90 % --   Wound Odor None None   Tunneling? No --   Undermining? No --   Sinus Tracts? No --       Inactive Orders   Date Order Priority Status Authorizing Provider   09/04/24 1556 Debridement Traumatic Left;Medial Leg Routine Completed Chapin Means MD   08/28/24 0848 Debridement Traumatic Left;Medial Leg Routine Completed Chapin Means MD   08/21/24 0809 Debridement Traumatic Left;Medial Leg Routine Completed Chapin Means MD       Compression Wrap 09/09/24 Leg Anterior;Left (Active)   Placement Date/Time: 09/09/24 0900   Location: Leg  Wound Location Orientation: Anterior;Left      Assessments 9/9/2024 10:46 AM 9/25/2024  4:17 PM   Response to Treatment Well tolerated Well tolerated   Compression Layers Multilayer (unna-boot 20-30mmHg) Multilayer   Compression Product Type Unna Boot (20-30mmHg) Unna Boot (20-30mmhg)   Dressing Applied Yes Yes   Compression Wrap Location Toes to Knee Toes to Knee   Compression Wrap Status Clean;Dry;Intact Clean;Dry;Intact       No associated orders.                ASSESSMENT AND PLAN:     Assessment     Encounter Diagnosis  1. 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 Idiopathic chronic venous hypertension of left lower extremity with ulcer and inflammation (HCC)    3. Sloughing of wound    4. Delayed wound healing    5. Protein-calorie malnutrition, unspecified severity (HCC)      PLAN OF CARE:    Keep previously healed area moisturized and clean  Watch out for signs of early infection - counseled.   Plan of care discussed with patient in detail - All questions answered   Return as needed only.     Patient Instructions   Keep previosuly healed area moisturized   Wear compression garment at all times when not in bed.       Patient/Caregiver Education: There are no barriers to learning. Medical education for  above diagnosis given.   Answered all questions.    Outcome: Patient verbalizes understanding. Patient is notified to call with any questions, complications, allergies, or worsening or changing symptoms.  Patient is to call with any side effects or complications as a result of the treatments today.      DOCUMENTATION OF TIME SPENT: Code selection for this visit was based on time spent : 25 min on date of service in preparing to see the patient, obtaining and/or reviewing separately obtained history, performing a medically appropriate examination, counseling and educating the patient/family/caregiver, ordering medications or testing, referring and communicating with other healthcare providers, documenting clinical information in the E HR, independently interpreting results and communicating results to the patient/family/caregiver and care coordination with the patient's other providers.    Followup: Return for if wound reopens - Discharge from Wound clinic..      Note to Patient:  The 21st Century Cures Act makes medical notes like these available to patients in the interest of transparency. However, be advised this is a medical document and is intended as [id number] communication; it is written in medical language and may appear blunt, direct, or contain abbreviations or verbiage that are unfamiliar. Medical documents are intended to carry relevant information, facts as evident, and the clinical opinion of the practitioner.    Also, please note that this report has been produced using speech recognition software and may contain errors related to that system including, but not limited to, errors in grammar, punctuation, and spelling, as well as words and phrases that possibly may have been recognized inappropriately.  If there are any questions or concerns, contact the dictating provider for clarification.      Chapin Blair MD  10/2/2024  11:42 AM